# Patient Record
Sex: MALE | Race: WHITE | NOT HISPANIC OR LATINO | URBAN - NONMETROPOLITAN AREA
[De-identification: names, ages, dates, MRNs, and addresses within clinical notes are randomized per-mention and may not be internally consistent; named-entity substitution may affect disease eponyms.]

---

## 2023-05-30 ENCOUNTER — TRANSCRIBE ORDERS (OUTPATIENT)
Dept: ADMINISTRATIVE | Facility: HOSPITAL | Age: 43
End: 2023-05-30

## 2023-05-30 DIAGNOSIS — M54.40 ACUTE LOW BACK PAIN WITH SCIATICA, SCIATICA LATERALITY UNSPECIFIED, UNSPECIFIED BACK PAIN LATERALITY: Primary | ICD-10-CM

## 2023-05-30 DIAGNOSIS — G62.9 POLYNEUROPATHY: ICD-10-CM

## 2023-06-04 ENCOUNTER — HOSPITAL ENCOUNTER (OUTPATIENT)
Dept: MRI IMAGING | Facility: HOSPITAL | Age: 43
Discharge: HOME | End: 2023-06-04
Payer: COMMERCIAL

## 2023-06-04 DIAGNOSIS — G62.9 POLYNEUROPATHY: ICD-10-CM

## 2023-06-04 DIAGNOSIS — M54.40 ACUTE LOW BACK PAIN WITH SCIATICA, SCIATICA LATERALITY UNSPECIFIED, UNSPECIFIED BACK PAIN LATERALITY: ICD-10-CM

## 2023-06-04 PROCEDURE — 72148 MRI LUMBAR SPINE W/O DYE: CPT | Performed by: RADIOLOGY

## 2023-06-04 PROCEDURE — 72148 MRI LUMBAR SPINE W/O DYE: CPT

## 2023-06-04 PROCEDURE — 70551 MRI BRAIN STEM W/O DYE: CPT

## 2023-06-04 PROCEDURE — 70551 MRI BRAIN STEM W/O DYE: CPT | Performed by: RADIOLOGY

## 2023-08-25 ENCOUNTER — LAB (OUTPATIENT)
Dept: LAB | Facility: HOSPITAL | Age: 43
End: 2023-08-25
Payer: COMMERCIAL

## 2023-08-25 ENCOUNTER — OFFICE VISIT (OUTPATIENT)
Dept: NEUROLOGY | Facility: CLINIC | Age: 43
End: 2023-08-25
Payer: COMMERCIAL

## 2023-08-25 VITALS
HEIGHT: 70 IN | BODY MASS INDEX: 26.08 KG/M2 | OXYGEN SATURATION: 98 % | HEART RATE: 70 BPM | DIASTOLIC BLOOD PRESSURE: 82 MMHG | WEIGHT: 182.2 LBS | SYSTOLIC BLOOD PRESSURE: 122 MMHG

## 2023-08-25 DIAGNOSIS — R20.0 NUMBNESS AND TINGLING OF BOTH UPPER EXTREMITIES: Primary | ICD-10-CM

## 2023-08-25 DIAGNOSIS — R20.2 NUMBNESS AND TINGLING OF BOTH UPPER EXTREMITIES: Primary | ICD-10-CM

## 2023-08-25 DIAGNOSIS — R20.2 NUMBNESS AND TINGLING OF BOTH UPPER EXTREMITIES: ICD-10-CM

## 2023-08-25 DIAGNOSIS — M62.81 MUSCLE WEAKNESS: ICD-10-CM

## 2023-08-25 DIAGNOSIS — R20.0 NUMBNESS AND TINGLING OF BOTH UPPER EXTREMITIES: ICD-10-CM

## 2023-08-25 LAB
CK SERPL-CCNC: 86 U/L (ref 20–200)
ERYTHROCYTE [SEDIMENTATION RATE] IN BLOOD: 4 MM/HR (ref 0–15)
FOLATE SERPL-MCNC: 19.3 NG/ML (ref 4.78–24.2)
VIT B12 BLD-MCNC: 864 PG/ML (ref 211–946)

## 2023-08-25 PROCEDURE — 86362 MOG-IGG1 ANTB CBA EACH: CPT

## 2023-08-25 PROCEDURE — 36415 COLL VENOUS BLD VENIPUNCTURE: CPT

## 2023-08-25 PROCEDURE — 85652 RBC SED RATE AUTOMATED: CPT

## 2023-08-25 PROCEDURE — 86148 ANTI-PHOSPHOLIPID ANTIBODY: CPT

## 2023-08-25 PROCEDURE — 82746 ASSAY OF FOLIC ACID SERUM: CPT

## 2023-08-25 PROCEDURE — 82607 VITAMIN B-12: CPT | Performed by: NURSE PRACTITIONER

## 2023-08-25 PROCEDURE — 86051 AQUAPORIN-4 ANTB ELISA: CPT

## 2023-08-25 PROCEDURE — 99204 OFFICE O/P NEW MOD 45 MIN: CPT | Performed by: NURSE PRACTITIONER

## 2023-08-25 PROCEDURE — 83519 RIA NONANTIBODY: CPT

## 2023-08-25 PROCEDURE — 82550 ASSAY OF CK (CPK): CPT

## 2023-08-25 RX ORDER — METHYLPREDNISOLONE 4 MG/1
TABLET ORAL
Qty: 1 EACH | Refills: 0 | Status: SHIPPED | OUTPATIENT
Start: 2023-08-25

## 2023-08-25 RX ORDER — CYCLOBENZAPRINE HCL 10 MG
1 TABLET ORAL 3 TIMES DAILY PRN
COMMUNITY
Start: 2023-08-14

## 2023-08-25 RX ORDER — MELOXICAM 15 MG/1
15 TABLET ORAL
COMMUNITY
Start: 2023-08-14 | End: 2023-08-25

## 2023-08-25 NOTE — PROGRESS NOTES
Neuro Office Visit      Encounter Date: 2023   Patient Name: Tai Amin  : 1980   MRN: [id number]   PCP:  Radha Yo MD     Chief Complaint:    Chief Complaint   Patient presents with    Fatigue    Numbness     Right upper and lower extremity       History of Present Illness: Tai Amin is a 42 y.o. male who is here today in Neurology for fatigue, right upper extremity numbness, right lower extremity numbness.    In  noted right lumbar pain which was dull in nature and he had difficulty sitting.  X-rays were performed and he was found to harbor arthritis in his back.    He also developed urinary difficulty as well as a sharp pain in his right side which moved down along his groin.  He was seen by neurology and diagnosed with prostatitis and treated for 2 weeks.    Back pain started to be severe and he had left lower extremity numbness.  He then developed left hand numbness.  Lumbar spine MRI was negative for significant disease or stenosis.    He went to PT and had dry needling which was helpful with the lumbar back pain but did nothing for the numbness.    He developed some mild right-sided facial numbness and then a pins-and-needles type sensation on the top of his head which was episodic in nature.    His hands will often cramp up.    Episodes of lightheadedness and feels like he has no energy.  He is also not sleeping well.    His cervical spine feels tight and often pops and cracks.    He has been seen by [facility] and had a work-up ruling out connective tissue disease.    He has noted some diminishing  strength.  He has also been dropping items.            Subjective      Past Medical History: History reviewed. No pertinent past medical history.    Past Surgical History: History reviewed. No pertinent surgical history.    Family History: History reviewed. No pertinent family history.    Social History:   Social History     Socioeconomic History     Marital status:    Tobacco Use    Smoking status: Never   Vaping Use    Vaping Use: Never used   Substance and Sexual Activity    Alcohol use: Not Currently     Alcohol/week: 2.0 standard drinks     Types: 2 Cans of beer per week    Drug use: Never    Sexual activity: Yes     Partners: Female     Birth control/protection: Condom       Medications:     Current Outpatient Medications:     cyclobenzaprine (FLEXERIL) 10 MG tablet, Take 1 tablet by mouth 3 (Three) Times a Day As Needed., Disp: , Rfl:     methylPREDNISolone (MEDROL) 4 MG dose pack, Take as directed on package instructions., Disp: 1 each, Rfl: 0    Allergies:   No Known Allergies    PHQ-9 Total Score:     STEADI Fall Risk Assessment has not been completed.    Objective     Physical Exam:   Physical Exam  Vitals reviewed.   Eyes:      Extraocular Movements: EOM normal.      Pupils: Pupils are equal, round, and reactive to light.   Neurological:      Mental Status: He is oriented to person, place, and time.      Coordination: Finger-Nose-Finger Test, Heel to Shin Test and Romberg Test normal.      Gait: Gait is intact. Tandem walk normal.      Deep Tendon Reflexes:      Reflex Scores:       Tricep reflexes are 2+ on the right side and 2+ on the left side.       Bicep reflexes are 2+ on the right side and 2+ on the left side.       Brachioradialis reflexes are 2+ on the right side and 2+ on the left side.       Patellar reflexes are 2+ on the right side and 2+ on the left side.       Achilles reflexes are 2+ on the right side and 2+ on the left side.  Psychiatric:         Speech: Speech normal.       Neurologic Exam     Mental Status   Oriented to person, place, and time.   Attention: normal. Concentration: normal.   Speech: speech is normal   Level of consciousness: alert  Knowledge: good.     Cranial Nerves     CN II   Visual fields full to confrontation.     CN III, IV, VI   Pupils are equal, round, and reactive to light.  Extraocular motions are  "normal.   CN III: no CN III palsy  CN VI: no CN VI palsy    CN V   Facial sensation intact.     CN VII   Facial expression full, symmetric.     CN VIII   CN VIII normal.     CN IX, X   CN IX normal.   CN X normal.     CN XI   CN XI normal.     Motor Exam     Strength   Right neck flexion: 5/5  Left neck flexion: 5/5  Right neck extension: 5/5  Left neck extension: 5/5  Right deltoid: 5/5  Left deltoid: 5/5  Right biceps: 5/5  Left biceps: 5/5  Right triceps: 5/5  Left triceps: 5/5  Right wrist flexion: 5/5  Left wrist flexion: 5/5  Right wrist extension: 5/5  Left wrist extension: 5/5  Right interossei: 5/5  Left interossei: 5/5  Right abdominals: 5/5  Left abdominals: 5/5  Right iliopsoas: 5/5  Left iliopsoas: 5/5  Right quadriceps: 5/5  Left quadriceps: 5/5  Right hamstrin/5  Left hamstrin/5  Right glutei: 5/5  Left glutei: 5/5  Right anterior tibial: 5/5  Left anterior tibial: 5/5  Right posterior tibial: 5/5  Left posterior tibial: 5/5  Right peroneal: 5/5  Left peroneal: 5/5  Right gastroc: 5/5  Left gastroc: 5/5    Sensory Exam   Light touch normal.     Gait, Coordination, and Reflexes     Gait  Gait: normal    Coordination   Romberg: negative  Finger to nose coordination: normal  Heel to shin coordination: normal  Tandem walking coordination: normal    Tremor   Resting tremor: absent  Intention tremor: absent  Action tremor: absent    Reflexes   Right brachioradialis: 2+  Left brachioradialis: 2+  Right biceps: 2+  Left biceps: 2+  Right triceps: 2+  Left triceps: 2+  Right patellar: 2+  Left patellar: 2+  Right achilles: 2+  Left achilles: 2+  Right : 2+  Left : 2+     Vital Signs:   Vitals:    23 1025   BP: 122/82   Pulse: 70   SpO2: 98%   Weight: 82.6 kg (182 lb 3.2 oz)   Height: 177.8 cm (70\")     Body mass index is 26.14 kg/mý.     Results:   Imaging:   MRI Brain Without Contrast    Result Date: 2023   1. No focal source for the patient's symptoms 2. No parenchymal mass, " hemorrhage, or midline shift.  This report was finalized on 6/4/2023 11:55 AM by Dr. Raheem Caputo MD.      MRI Lumbar Spine Without Contrast    Result Date: 6/4/2023    No acute findings in the lumbar spine.  This report was finalized on 6/4/2023 11:55 AM by Dr. Raheem Caputo MD.         Labs:    No results found for: CMP, PROTEIN, ANTIMOGAB, [id number], JCVRESULT, QUANTTBGOLD, CBCDIF, IGGALBSER     Assessment / Plan      Assessment/Plan:   Diagnoses and all orders for this visit:    1. Numbness and tingling of both upper extremities (Primary)  -     Vitamin B12  -     Folate; Future  -     Anti-Myelin Oligodendrocyte Glycoprotein (MOG), Serum; Future  -     CK; Future  -     Sedimentation Rate; Future  -     Antiphosphatidylserine IgG / M; Future  -     NMO IgG Autoantibodies; Future  -     Acetylcholine Receptor Antibody Panel; Future  -     Paraneoplastic Autoantibody Evalulation; Future  -     MRI Cervical Spine With & Without Contrast; Future    2. Muscle weakness  -     Vitamin B12  -     Folate; Future  -     Anti-Myelin Oligodendrocyte Glycoprotein (MOG), Serum; Future  -     CK; Future  -     Sedimentation Rate; Future  -     Antiphosphatidylserine IgG / M; Future  -     NMO IgG Autoantibodies; Future  -     Acetylcholine Receptor Antibody Panel; Future  -     Paraneoplastic Autoantibody Evalulation; Future  -     MRI Cervical Spine With & Without Contrast; Future    Other orders  -     methylPREDNISolone (MEDROL) 4 MG dose pack; Take as directed on package instructions.  Dispense: 1 each; Refill: 0           Patient Education:     Reviewed medications, potential side effects and signs and symptoms to report. Discussed risk versus benefits of treatment plan with patient and/or family-including medications, labs and radiology that may be ordered. Addressed questions and concerns during visit. Patient and/or family verbalized understanding and agree with plan. Instructed to call the office with any  questions and report to ER with any life-threatening symptoms.     Follow Up:   Return in about 3 months (around 11/25/2023).    I spent 30  minutes face to face with the patient and family. I personally spent 50 percent of this time counseling and discussing diagnosis, diagnostic testing, treatment options, and management .       During this visit the following were done:  Labs Reviewed [x]    Labs Ordered [x]    Radiology Reports Reviewed [x]    Radiology Ordered [x]    PCP Records Reviewed [x]    Referring Provider Records Reviewed [x]    ER Records Reviewed []    Hospital Records Reviewed []    History Obtained From Family []    Radiology Images Reviewed [x]    Other Reviewed []    Records Requested []      JUANA Burciaga  [facility]  [address] [address] [phone]

## 2023-08-28 LAB
AQP4 H2O CHANNEL IGG SERPL IA-ACNC: <1.5 U/ML (ref 0–3)
MOG AB SER QL CBA IFA: NEGATIVE

## 2023-08-29 LAB
AMPAR1 AB SER QL IF: NEGATIVE
AMPAR2 AB SER QL IF: NEGATIVE
AMPHIPHYSIN AB SER QL: NEGATIVE
AQP4 H2O CHANNEL AB SERPL IA-ACNC: NEGATIVE
CASPR2 IGG SERPL QL IF: NEGATIVE
CV2 IGG SER-ACNC: NEGATIVE
DPPX IGG SERPL QL IF: NEGATIVE
GABABR AB SER QL IF: NEGATIVE
GAD65 IGG+IGM SER IA-SCNC: NEGATIVE NMOL/L
GLIAL NUC TYPE 1 AB SER QL IF: NEGATIVE
HU AB SER QL IF: NEGATIVE
HU2 AB SER QL IF: NEGATIVE
HU3 AB SER QL: NEGATIVE
IGLON5 IGG SER QL IF: NEGATIVE
IMP & REVIEW OF LAB RESULTS: NEGATIVE
IP3R 1 IGG SER QL IF: NEGATIVE
LGI1 IGG SERPL QL IF: NEGATIVE
MA+TA AB SER QL: NEGATIVE
MGLUR1 IGG SER QL IF: NEGATIVE
NMDAR1 AB SER QL IF: NEGATIVE
PCA-1 AB SER QL IF: NEGATIVE
PCA-2 AB SER QL IF: NEGATIVE
PCA-TR AB SER QL IF: NEGATIVE
PCA-TR AB SER QL IF: NEGATIVE
PS IGG SER-ACNC: <9 UNITS (ref 0–30)
PS IGM SER-ACNC: <10 UNITS (ref 0–30)
VGCC AB SER-SCNC: <1 PMOL/L (ref 0–30)
ZIC4 AB SER QL: NEGATIVE

## 2023-08-29 NOTE — PROGRESS NOTES
Please let Mr. Amin know that so far all labs have returned normal.  I would still have 3 outstanding labs and will let him know when those return.

## 2023-09-01 LAB
ACHR BIND AB SER-SCNC: <0.03 NMOL/L (ref 0–0.24)
ACHR BLOCK AB SER-ACNC: 10 % (ref 0–25)
ACHR MOD AB SER QL FC: 0 % (ref 0–45)

## 2023-09-05 NOTE — PROGRESS NOTES
Please let Tai know that all labs are normal with no evidence of neuromuscular disease.   Thanks, BJ

## 2023-09-30 ENCOUNTER — HOSPITAL ENCOUNTER (OUTPATIENT)
Dept: MRI IMAGING | Facility: HOSPITAL | Age: 43
Discharge: HOME | End: 2023-09-30
Admitting: NURSE PRACTITIONER
Payer: COMMERCIAL

## 2023-09-30 DIAGNOSIS — R20.2 NUMBNESS AND TINGLING OF BOTH UPPER EXTREMITIES: ICD-10-CM

## 2023-09-30 DIAGNOSIS — R20.0 NUMBNESS AND TINGLING OF BOTH UPPER EXTREMITIES: ICD-10-CM

## 2023-09-30 DIAGNOSIS — M62.81 MUSCLE WEAKNESS: ICD-10-CM

## 2023-09-30 PROCEDURE — 72141 MRI NECK SPINE W/O DYE: CPT

## 2023-12-01 ENCOUNTER — OFFICE VISIT (OUTPATIENT)
Dept: NEUROLOGY | Facility: CLINIC | Age: 43
End: 2023-12-01
Payer: COMMERCIAL

## 2023-12-01 VITALS
WEIGHT: 187.4 LBS | OXYGEN SATURATION: 96 % | SYSTOLIC BLOOD PRESSURE: 114 MMHG | BODY MASS INDEX: 26.83 KG/M2 | HEART RATE: 76 BPM | HEIGHT: 70 IN | DIASTOLIC BLOOD PRESSURE: 76 MMHG

## 2023-12-01 DIAGNOSIS — R20.0 NUMBNESS AND TINGLING OF BOTH UPPER EXTREMITIES: ICD-10-CM

## 2023-12-01 DIAGNOSIS — G56.21 CUBITAL TUNNEL SYNDROME ON RIGHT: ICD-10-CM

## 2023-12-01 DIAGNOSIS — R76.8 ANA POSITIVE: Primary | ICD-10-CM

## 2023-12-01 DIAGNOSIS — R20.2 NUMBNESS AND TINGLING OF BOTH UPPER EXTREMITIES: ICD-10-CM

## 2023-12-01 PROCEDURE — 99214 OFFICE O/P EST MOD 30 MIN: CPT | Performed by: NURSE PRACTITIONER

## 2023-12-01 RX ORDER — MELOXICAM 15 MG/1
15 TABLET ORAL
COMMUNITY
Start: 2023-11-02

## 2023-12-01 RX ORDER — GABAPENTIN 100 MG/1
100 CAPSULE ORAL DAILY
Qty: 30 CAPSULE | Refills: 2 | Status: SHIPPED | OUTPATIENT
Start: 2023-12-01 | End: 2024-11-30

## 2023-12-01 NOTE — PROGRESS NOTES
Neuro Office Visit      Encounter Date: 2023   Patient Name: Tai Amin  : 1980   MRN: [id number]   PCP:  Radha Yo MD     Chief Complaint:    Chief Complaint   Patient presents with    Numbness     F/U       History of Present Illness: Tai Amin is a 43 y.o. male who is here today in Neurology for numbness and tingling.    Continues to have numbness and tingling in his right arm from the elbow down into the fourth and fifth fingers.  He also complains of aching pain in his palms bilaterally.    Occasional right facial numbness and burning along the right cheekbone to his nose and occasionally up into the forehead.    Continues to have joint pain in his elbows and knees.    Previously evaluated at [facility] rheumatology for positive CAROLYN.      Subjective      Past Medical History: History reviewed. No pertinent past medical history.    Past Surgical History: History reviewed. No pertinent surgical history.    Family History: History reviewed. No pertinent family history.    Social History:   Social History     Socioeconomic History    Marital status:    Tobacco Use    Smoking status: Never   Vaping Use    Vaping Use: Never used   Substance and Sexual Activity    Alcohol use: Not Currently     Alcohol/week: 2.0 standard drinks of alcohol     Types: 2 Cans of beer per week    Drug use: Never    Sexual activity: Yes     Partners: Female     Birth control/protection: Condom       Medications:     Current Outpatient Medications:     meloxicam (MOBIC) 15 MG tablet, Take 1 tablet by mouth every night at bedtime., Disp: , Rfl:     gabapentin (Neurontin) 100 MG capsule, Take 1 capsule by mouth Daily., Disp: 30 capsule, Rfl: 2    Allergies:   No Known Allergies    PHQ-9 Total Score:     STEADI Fall Risk Assessment has not been completed.    Objective     Physical Exam:   Physical Exam  Vitals reviewed.   Eyes:      Extraocular Movements: EOM normal.      Pupils: Pupils are equal,  "round, and reactive to light.   Neurological:      Mental Status: He is oriented to person, place, and time.      Gait: Gait is intact.   Psychiatric:         Speech: Speech normal.         Neurologic Exam     Mental Status   Oriented to person, place, and time.   Attention: normal. Concentration: normal.   Speech: speech is normal   Level of consciousness: alert  Knowledge: good.     Cranial Nerves     CN II   Visual fields full to confrontation.     CN III, IV, VI   Pupils are equal, round, and reactive to light.  Extraocular motions are normal.   CN III: no CN III palsy  CN VI: no CN VI palsy    CN V   Facial sensation intact.     CN VII   Facial expression full, symmetric.     CN VIII   CN VIII normal.     CN IX, X   CN IX normal.   CN X normal.     CN XI   CN XI normal.     CN XII   CN XII normal.     Motor Exam     Strength   Right neck flexion: 5/5  Left neck flexion: 5/5  Right neck extension: 5/5  Left neck extension: 5/5  Right deltoid: 5/5  Left deltoid: 5/5  Right biceps: 5/5  Left biceps: 5/5  Right triceps: 5/5  Left triceps: 5/5  Right wrist flexion: 5/5  Left wrist flexion: 5/5  Right wrist extension: 5/5  Left wrist extension: 5/5  Right interossei: 5/5  Left interossei: 5/5  Right abdominals: 5/5  Left abdominals: 5/5  Right iliopsoas: 5/5  Left iliopsoas: 5/5  Right quadriceps: 5/5  Left quadriceps: 5/5  Right hamstrin/5  Left hamstrin/5  Right glutei: 5/5  Left glutei: 5/5  Right anterior tibial: 5/5  Left anterior tibial: 5/5  Right posterior tibial: 5/5  Left posterior tibial: 5/5  Right peroneal: 5/5  Left peroneal: 5/5  Right gastroc: 5/5  Left gastroc: 5/5    Sensory Exam   Light touch normal.     Gait, Coordination, and Reflexes     Gait  Gait: normal       Vital Signs:   Vitals:    23 1437   BP: 114/76   Pulse: 76   SpO2: 96%   Weight: 85 kg (187 lb 6.4 oz)   Height: 177.8 cm (70\")     Body mass index is 26.89 kg/m².     Results:   Imaging:   MRI Cervical Spine Without " "Contrast    Result Date: 10/1/2023    No acute findings in the cervical spine.   This report was finalized on 10/1/2023 5:24 PM by Dr. Mendez Carlos MD.         Labs:    No results found for: \"CMP\", \"PROTEIN\", \"ANTIMOGAB\", \"[id number]\", \"JCVRESULT\", \"QUANTTBGOLD\", \"CBCDIF\", \"IGGALBSER\"     Assessment / Plan      Assessment/Plan:   Diagnoses and all orders for this visit:    1. CAROLYN positive (Primary)  Comments:  Speckled pattern  Orders:  -     Ambulatory Referral to Rheumatology    2. Cubital tunnel syndrome on right  Comments:  Referral to orthopedics  Orders:  -     Ambulatory Referral to Orthopedic Surgery    3. Numbness and tingling of both upper extremities  Comments:  Start gabapentin  Orders:  -     gabapentin (Neurontin) 100 MG capsule; Take 1 capsule by mouth Daily.  Dispense: 30 capsule; Refill: 2           Patient Education:     Reviewed medications, potential side effects and signs and symptoms to report. Discussed risk versus benefits of treatment plan with patient and/or family-including medications, labs and radiology that may be ordered. Addressed questions and concerns during visit. Patient and/or family verbalized understanding and agree with plan. Instructed to call the office with any questions and report to ER with any life-threatening symptoms.     Follow Up:   Return in about 3 months (around 3/1/2024) for Video visit.    I spent 30  minutes face to face with the patient and family. I personally spent 50 percent of this time counseling and discussing diagnosis, diagnostic testing, driving, treatment options, and management .       During this visit the following were done:  Labs Reviewed [x]    Labs Ordered []    Radiology Reports Reviewed []    Radiology Ordered []    PCP Records Reviewed []    Referring Provider Records Reviewed []    ER Records Reviewed []    Hospital Records Reviewed []    History Obtained From Family []    Radiology Images Reviewed []    Other Reviewed []    Records " Requested []      JUANA Burciaga  [facility]  [address] [address] [phone]

## 2023-12-05 ENCOUNTER — TELEPHONE (OUTPATIENT)
Dept: ORTHOPEDIC SURGERY | Facility: CLINIC | Age: 43
End: 2023-12-05

## 2023-12-05 NOTE — TELEPHONE ENCOUNTER
Hub staff attempted to follow warm transfer process and was unsuccessful     Caller: Tai Amin    Relationship to patient: Self    Best call back number: [phone]    Patient is needing: PATIENT WOULD LIKE TO RESCHEDULE 12/08/23 FOR DIFFERENT DATE LATER AFTERNOON.

## 2023-12-28 ENCOUNTER — TELEPHONE (OUTPATIENT)
Dept: NEUROLOGY | Facility: CLINIC | Age: 43
End: 2023-12-28
Payer: COMMERCIAL

## 2023-12-28 NOTE — TELEPHONE ENCOUNTER
RAMON FROM DR ELAINE MICHAUD'S OFFICE - [facility], CALLING TO ASK FOR ADJUSTMENT TO PATIENTS GABAPENTIN.    CAN GABAPENTIN BE INCREASED  MG    EVENTUALLY 3X DAILY    PLEASE ADVISE RAMON AT:  [phone]

## 2024-02-28 ENCOUNTER — TELEPHONE (OUTPATIENT)
Dept: NEUROLOGY | Facility: CLINIC | Age: 44
End: 2024-02-28
Payer: COMMERCIAL

## 2024-02-28 NOTE — TELEPHONE ENCOUNTER
----- Message from Tai Amin sent at 2/28/2024 10:45 AM EST -----  Regarding: Appointment Cancellation Request  Contact: [phone]  Tai Amin would like to cancel the following appointments:    Tessie Resendiz in [facility] ([phone]), 3/4/2024  3:00 PM    Comments:  I was referred to [facility] with another neurologist. Thank you.

## 2024-07-22 ENCOUNTER — TELEPHONE (OUTPATIENT)
Age: 44
End: 2024-07-22
Payer: COMMERCIAL

## 2024-07-22 NOTE — TELEPHONE ENCOUNTER
PT IS CALLING BECAUSE HE RECEIVED A MISSED CALL FROM US ABOUT NEEDING A FOLLOW UP APPT BUT THE PT SAID AT HIS LAST APPT HE WAS TOLD HIS ISSUE WOULD NEED TO BE SEEN BY A NEUROLOGIST. PT WANTS TO CONFIRM BEFORE SCHEDULING A FOLLOW UP.

## 2024-07-24 NOTE — TELEPHONE ENCOUNTER
He was last seen by [facility] neurology in August 2023.  Then he was seen by the [facility]; The last time I saw him was in April 2024 at that time he was having significant neurologic issues including numbness and tingling of his upper extremities hands face scalp testicles right lower extremity and buttock. he had an abnormal EMG/NCV study.  We were unable to diagnose him with any type of connective tissue disease such as lupus etc..  He also has some cognitive issues.  I had questions as to whether or not his neurologic symptoms could be related to the [place] spotted fever he had in the past or secondary to the doxycycline, But I could not relate it to a connective tissue disease.  If he is not seeing neurology at [facility] regularly then he needs to follow-up with [facility] neurology where he was seen last summer in terms of these issues.  If at all possible can we print a copy of his patient plan from his last appointment in next GEN so that he can take that with him.  I certainly do not mind to see him but I have no answers for him at this time other than for him to see neurology!